# Patient Record
Sex: FEMALE | Race: WHITE | Employment: FULL TIME | URBAN - METROPOLITAN AREA
[De-identification: names, ages, dates, MRNs, and addresses within clinical notes are randomized per-mention and may not be internally consistent; named-entity substitution may affect disease eponyms.]

---

## 2017-02-23 ENCOUNTER — APPOINTMENT (OUTPATIENT)
Dept: LAB | Facility: HOSPITAL | Age: 52
End: 2017-02-23
Attending: OTOLARYNGOLOGY
Payer: COMMERCIAL

## 2017-02-23 DIAGNOSIS — E20.9 HYPOPARATHYROIDISM, UNSPECIFIED HYPOPARATHYROIDISM TYPE (HCC): ICD-10-CM

## 2017-02-23 LAB
CALCIUM BLD-MCNC: 9.6 MG/DL (ref 8.3–10.3)
PTH-INTACT SERPL-MCNC: 6.7 PG/ML (ref 11.1–79.5)

## 2017-02-23 PROCEDURE — 36415 COLL VENOUS BLD VENIPUNCTURE: CPT

## 2017-02-23 PROCEDURE — 83970 ASSAY OF PARATHORMONE: CPT

## 2017-02-23 PROCEDURE — 82310 ASSAY OF CALCIUM: CPT

## 2017-02-24 NOTE — PROGRESS NOTES
Quick Note:    Please inform ca is normal pth is still low, recommend to continue with current dosing of ca/vit d and keep follow up with Dr Hood Nurse repeat ca/pth in 6 months  ______

## 2017-03-21 ENCOUNTER — APPOINTMENT (OUTPATIENT)
Dept: LAB | Facility: HOSPITAL | Age: 52
End: 2017-03-21
Attending: OTOLARYNGOLOGY
Payer: COMMERCIAL

## 2017-03-21 DIAGNOSIS — E21.0 HYPERPARATHYROIDISM, PRIMARY (HCC): ICD-10-CM

## 2017-03-21 DIAGNOSIS — E04.1 THYROID NODULE: ICD-10-CM

## 2017-03-21 DIAGNOSIS — D35.1 PARATHYROID ADENOMA: ICD-10-CM

## 2017-03-21 LAB
CALCIUM BLD-MCNC: 10.2 MG/DL (ref 8.3–10.3)
PTH-INTACT SERPL-MCNC: <2.5 PG/ML (ref 11.1–79.5)

## 2017-03-21 PROCEDURE — 83970 ASSAY OF PARATHORMONE: CPT

## 2017-03-21 PROCEDURE — 36415 COLL VENOUS BLD VENIPUNCTURE: CPT

## 2017-03-21 PROCEDURE — 82310 ASSAY OF CALCIUM: CPT

## 2017-03-21 PROCEDURE — 82652 VIT D 1 25-DIHYDROXY: CPT

## 2017-03-21 NOTE — PROGRESS NOTES
Quick Note:    Please inform ca is normal pth is still low, please confirm she is taking daily calcium and vitamin d supplementation and she is to continue current dose, make sure she has no numbness tingling or cramping which are signs of low ca.  Repeat c

## 2017-03-23 LAB — 1,25-DIHYDROXYVITAMIN D: 30.4 PG/ML

## 2017-03-23 NOTE — PROGRESS NOTES
Quick Note:    Please inform vitamin d level is normal, recommend to keep current dose of vit d due to vitamin d at low end of normal repeat in 6 months with ca/pth  ______

## 2017-03-24 NOTE — PROGRESS NOTES
Quick Note:    [phone] (home)   Spoke with pt.  Pt is taking daily calcium and vit d supplementation  Pt has no symptoms of numbness,tingling or cramping   Reviewed results and instructions with pt  Understanding verbalized  ______

## 2017-08-23 ENCOUNTER — HOSPITAL ENCOUNTER (OUTPATIENT)
Dept: ULTRASOUND IMAGING | Facility: HOSPITAL | Age: 52
Discharge: HOME | End: 2017-08-23
Attending: OTOLARYNGOLOGY
Payer: COMMERCIAL

## 2017-08-23 DIAGNOSIS — E21.0 HYPERPARATHYROIDISM, PRIMARY (HCC): ICD-10-CM

## 2017-08-23 DIAGNOSIS — D35.1 PARATHYROID ADENOMA: ICD-10-CM

## 2017-08-23 DIAGNOSIS — E04.1 THYROID NODULE: ICD-10-CM

## 2017-08-23 PROCEDURE — 76536 US EXAM OF HEAD AND NECK: CPT | Performed by: OTOLARYNGOLOGY

## 2017-11-18 ENCOUNTER — IMAGING SERVICES (OUTPATIENT)
Dept: OTHER | Age: 52
End: 2017-11-18

## 2017-11-18 ENCOUNTER — HOSPITAL (OUTPATIENT)
Dept: OTHER | Age: 52
End: 2017-11-18

## 2018-01-20 ENCOUNTER — IMAGING SERVICES (OUTPATIENT)
Dept: OTHER | Age: 53
End: 2018-01-20

## 2018-01-20 ENCOUNTER — HOSPITAL (OUTPATIENT)
Dept: OTHER | Age: 53
End: 2018-01-20

## 2018-08-24 ENCOUNTER — HOSPITAL ENCOUNTER (OUTPATIENT)
Dept: ULTRASOUND IMAGING | Facility: HOSPITAL | Age: 53
Discharge: HOME | End: 2018-08-24
Attending: OTOLARYNGOLOGY
Payer: COMMERCIAL

## 2018-08-24 DIAGNOSIS — E07.9 THYROID DYSFUNCTION: ICD-10-CM

## 2018-08-24 PROCEDURE — 76536 US EXAM OF HEAD AND NECK: CPT | Performed by: OTOLARYNGOLOGY

## 2018-08-27 NOTE — PROGRESS NOTES
Overall no significant changes in size of thyroid nodules recommend keep follow up to further discuss

## 2018-08-27 NOTE — PROGRESS NOTES
Informed pt of results and recommendations per KO. Confirmed upcoming appt. Pt voiced understanding.

## 2018-10-09 PROBLEM — Z86.39 HISTORY OF HYPERPARATHYROIDISM: Status: ACTIVE | Noted: 2018-10-09

## 2018-11-08 PROBLEM — N95.1 PERIMENOPAUSAL SYMPTOMS: Status: ACTIVE | Noted: 2018-11-08

## 2019-02-13 PROBLEM — G47.00 INSOMNIA, UNSPECIFIED TYPE: Status: ACTIVE | Noted: 2019-02-13

## 2019-09-27 PROCEDURE — 81001 URINALYSIS AUTO W/SCOPE: CPT | Performed by: FAMILY MEDICINE

## 2020-11-09 ENCOUNTER — HOSPITAL ENCOUNTER (OUTPATIENT)
Dept: ULTRASOUND IMAGING | Facility: HOSPITAL | Age: 55
Discharge: HOME | End: 2020-11-09
Attending: OTOLARYNGOLOGY
Payer: COMMERCIAL

## 2020-11-09 DIAGNOSIS — E21.0 HYPERPARATHYROIDISM, PRIMARY (HCC): ICD-10-CM

## 2020-11-09 PROCEDURE — 76536 US EXAM OF HEAD AND NECK: CPT | Performed by: OTOLARYNGOLOGY

## 2020-11-11 NOTE — PROGRESS NOTES
Darren Quiroz, your thyroid ultrasound is showing one nodule is smaller and one is stable. We will plan to repeat a thyroid ultrasound in 1 year.

## 2020-11-18 ENCOUNTER — HOSPITAL ENCOUNTER (OUTPATIENT)
Dept: CT IMAGING | Facility: HOSPITAL | Age: 55
Discharge: HOME | End: 2020-11-18
Attending: FAMILY MEDICINE

## 2020-11-18 DIAGNOSIS — Z13.6 SCREENING FOR CARDIOVASCULAR CONDITION: ICD-10-CM

## 2020-11-19 ENCOUNTER — ORDER TRANSCRIPTION (OUTPATIENT)
Dept: ADMINISTRATIVE | Facility: HOSPITAL | Age: 55
End: 2020-11-19

## 2020-11-19 DIAGNOSIS — Z13.9 ENCOUNTER FOR SCREENING: Primary | ICD-10-CM

## 2020-11-19 NOTE — PROGRESS NOTES
Dominique Llanes,  As you know, the calcium score for your heart was 0. This is an excellent result.     The CT of the chest performed as part of your heart scan is reviewed also by radiologist.  The radiologist noted incidental findings including some tiny lung n

## 2020-11-19 NOTE — PROGRESS NOTES
Karen Plasencia,  As you know, the calcium score for your heart was 0.  This is an excellent result.     The CT of the chest performed as part of your heart scan is reviewed also by radiologist.  The radiologist noted incidental findings including some tiny lung

## 2020-12-17 ENCOUNTER — IMMUNIZATION (OUTPATIENT)
Dept: LAB | Facility: HOSPITAL | Age: 55
End: 2020-12-17
Attending: PREVENTIVE MEDICINE
Payer: COMMERCIAL

## 2020-12-17 DIAGNOSIS — Z23 NEED FOR VACCINATION: ICD-10-CM

## 2020-12-17 PROCEDURE — 0001A PFIZER-BIONTECH COVID-19 VACCINE: CPT | Performed by: NURSE PRACTITIONER

## 2021-01-07 ENCOUNTER — IMMUNIZATION (OUTPATIENT)
Dept: LAB | Facility: HOSPITAL | Age: 56
End: 2021-01-07
Attending: PREVENTIVE MEDICINE
Payer: COMMERCIAL

## 2021-01-07 DIAGNOSIS — Z23 NEED FOR VACCINATION: ICD-10-CM

## 2021-01-07 PROCEDURE — 0002A SARSCOV2 VAC 30MCG/0.3ML IM: CPT

## 2021-01-19 ENCOUNTER — HOSPITAL ENCOUNTER (OUTPATIENT)
Dept: CARDIOLOGY CLINIC | Facility: HOSPITAL | Age: 56
Discharge: HOME | End: 2021-01-19
Attending: FAMILY MEDICINE

## 2021-01-19 DIAGNOSIS — Z13.9 ENCOUNTER FOR SCREENING: ICD-10-CM

## 2021-01-21 NOTE — PROGRESS NOTES
Roger Hidalgo,  The screening ultrasound for your carotid arteries suggested that there was a small amount of atherosclerosis in your left internal carotid artery. I have ordered a formal ultrasound of the carotids to better evaluate this.   I will coordinate t

## 2021-10-22 ENCOUNTER — IMMUNIZATION (OUTPATIENT)
Dept: LAB | Facility: HOSPITAL | Age: 56
End: 2021-10-22
Attending: EMERGENCY MEDICINE
Payer: COMMERCIAL

## 2021-10-22 DIAGNOSIS — Z23 NEED FOR VACCINATION: Primary | ICD-10-CM

## 2021-10-22 PROCEDURE — 0004A SARSCOV2 VAC 30MCG/0.3ML IM: CPT

## 2021-10-22 PROCEDURE — 0003A SARSCOV2 VAC 30MCG/0.3ML IM: CPT

## 2022-09-11 ENCOUNTER — IMMUNIZATION (OUTPATIENT)
Dept: LAB | Age: 57
End: 2022-09-11
Attending: EMERGENCY MEDICINE
Payer: COMMERCIAL

## 2022-09-11 DIAGNOSIS — Z23 NEED FOR VACCINATION: Primary | ICD-10-CM

## 2022-09-11 PROCEDURE — 0124A SARSCOV2 VAC BVL 30MCG/0.3ML: CPT

## 2022-10-24 ENCOUNTER — IMMUNIZATION (OUTPATIENT)
Dept: LAB | Facility: HOSPITAL | Age: 57
End: 2022-10-24
Attending: PREVENTIVE MEDICINE
Payer: COMMERCIAL

## 2022-10-24 DIAGNOSIS — Z23 NEED FOR VACCINATION: Primary | ICD-10-CM

## 2022-10-24 PROCEDURE — 90471 IMMUNIZATION ADMIN: CPT
